# Patient Record
Sex: MALE | Race: OTHER | NOT HISPANIC OR LATINO | URBAN - METROPOLITAN AREA
[De-identification: names, ages, dates, MRNs, and addresses within clinical notes are randomized per-mention and may not be internally consistent; named-entity substitution may affect disease eponyms.]

---

## 2017-10-29 ENCOUNTER — EMERGENCY (EMERGENCY)
Facility: HOSPITAL | Age: 52
LOS: 1 days | Discharge: HOME | End: 2017-10-29
Attending: EMERGENCY MEDICINE
Payer: MEDICAID

## 2017-10-29 PROCEDURE — 99284 EMERGENCY DEPT VISIT MOD MDM: CPT | Mod: 25

## 2017-10-30 VITALS
DIASTOLIC BLOOD PRESSURE: 75 MMHG | OXYGEN SATURATION: 97 % | SYSTOLIC BLOOD PRESSURE: 120 MMHG | RESPIRATION RATE: 16 BRPM | TEMPERATURE: 98 F | WEIGHT: 149.91 LBS | HEIGHT: 66 IN | HEART RATE: 65 BPM

## 2017-10-30 PROCEDURE — 99283 EMERGENCY DEPT VISIT LOW MDM: CPT

## 2017-10-30 RX ORDER — DEXAMETHASONE 0.5 MG/5ML
4 ELIXIR ORAL ONCE
Qty: 0 | Refills: 0 | Status: COMPLETED | OUTPATIENT
Start: 2017-10-30 | End: 2017-10-30

## 2017-10-30 RX ADMIN — Medication 4 MILLIGRAM(S): at 00:54

## 2017-10-30 RX ADMIN — Medication 1 TABLET(S): at 00:54

## 2017-10-30 NOTE — ED PROVIDER NOTE - CHPI ED SYMPTOMS NEG
no chills/no fever/no headache/no shortness of breath/no vomiting/no decreased eating/drinking/no diarrhea/no abdominal pain/no rash/no cough

## 2017-10-30 NOTE — ED PROVIDER NOTE - OBJECTIVE STATEMENT
my throat hurts  51 year old, non smoker works as a  complaining of pain to throat for several weeks much worse over two days.  no fever no chills, able to swallow with out difficutly but with pain,  no sob   no cough no sick contacts no travel

## 2017-10-30 NOTE — ED PROVIDER NOTE - THROAT FINDINGS
THROAT RED/uvula midline/NO VESICLES/ULCERS/NO DROOLING/NO TONGUE ELEVATION/TONSILLAR SWELLING/NO STRIDOR/no exudate/no vesicles

## 2017-11-06 DIAGNOSIS — J03.90 ACUTE TONSILLITIS, UNSPECIFIED: ICD-10-CM

## 2018-06-10 ENCOUNTER — EMERGENCY (EMERGENCY)
Facility: HOSPITAL | Age: 53
LOS: 1 days | Discharge: LEFT BEFORE DISCHARGE | End: 2018-06-10
Attending: EMERGENCY MEDICINE

## 2018-06-10 VITALS
HEIGHT: 66 IN | WEIGHT: 169.98 LBS | OXYGEN SATURATION: 97 % | RESPIRATION RATE: 16 BRPM | DIASTOLIC BLOOD PRESSURE: 88 MMHG | TEMPERATURE: 98 F | SYSTOLIC BLOOD PRESSURE: 139 MMHG | HEART RATE: 16 BPM

## 2018-06-10 NOTE — ED ADULT NURSE NOTE - CAS ED LWBS REASON YN
PATIENT LEFT WITHOUT BEING SEEN BY  AND THIS RN , CALLED IN ED WAITING ROOM X3 , NO ANSWER , PATIENT CALLED VIA TELEPHONE # PROVIDED , UNABLE TO REACH , MESSAGE LEFT TO CALL ED . AWARE./no

## 2018-06-10 NOTE — ED ADULT TRIAGE NOTE - CHIEF COMPLAINT QUOTE
"When I eat the food tries to come up and my breath smells very bad. I got medicine for this here before and it helped me"

## 2018-10-14 ENCOUNTER — EMERGENCY (EMERGENCY)
Facility: HOSPITAL | Age: 53
LOS: 1 days | Discharge: HOME | End: 2018-10-14
Attending: EMERGENCY MEDICINE
Payer: MEDICAID

## 2018-10-14 VITALS
HEART RATE: 78 BPM | SYSTOLIC BLOOD PRESSURE: 164 MMHG | DIASTOLIC BLOOD PRESSURE: 92 MMHG | RESPIRATION RATE: 17 BRPM | OXYGEN SATURATION: 100 % | TEMPERATURE: 99 F

## 2018-10-14 PROCEDURE — 99283 EMERGENCY DEPT VISIT LOW MDM: CPT

## 2018-10-14 RX ORDER — CHLORHEXIDINE GLUCONATE 213 G/1000ML
15 SOLUTION TOPICAL
Qty: 200 | Refills: 0 | OUTPATIENT
Start: 2018-10-14 | End: 2018-11-12

## 2018-10-14 RX ORDER — OMEPRAZOLE 10 MG/1
1 CAPSULE, DELAYED RELEASE ORAL
Qty: 30 | Refills: 0 | OUTPATIENT
Start: 2018-10-14 | End: 2018-11-12

## 2018-10-14 NOTE — ED PROVIDER NOTE - OBJECTIVE STATEMENT
53 y/o M pt presents to ED explaining that every time he drinks coffee or tea it comes out of his nose. Food does not come out of his nose or any other beverages, only coffee and tea. He has been experiencing these symptoms for about a year. Pt says his stomach doesn't hurt when he eats but he is hungry often. Pt admits that he is under a lot of stress.

## 2020-07-10 NOTE — ED PROVIDER NOTE - MEDICAL DECISION MAKING DETAILS
Patient Pt was advised to obtain PMD to obtain medical care. Will follow up with dentist, ENT, and GI.

## 2022-05-21 ENCOUNTER — EMERGENCY (EMERGENCY)
Facility: HOSPITAL | Age: 57
LOS: 1 days | Discharge: HOME | End: 2022-05-21
Attending: EMERGENCY MEDICINE
Payer: SELF-PAY

## 2022-05-21 VITALS
RESPIRATION RATE: 18 BRPM | HEIGHT: 66 IN | SYSTOLIC BLOOD PRESSURE: 148 MMHG | OXYGEN SATURATION: 99 % | TEMPERATURE: 98 F | WEIGHT: 160.06 LBS | HEART RATE: 58 BPM | DIASTOLIC BLOOD PRESSURE: 80 MMHG

## 2022-05-21 PROCEDURE — 99283 EMERGENCY DEPT VISIT LOW MDM: CPT

## 2022-05-21 RX ORDER — ACETAMINOPHEN 500 MG
650 TABLET ORAL ONCE
Refills: 0 | Status: COMPLETED | OUTPATIENT
Start: 2022-05-21 | End: 2022-05-21

## 2022-05-21 RX ORDER — IBUPROFEN 200 MG
600 TABLET ORAL ONCE
Refills: 0 | Status: COMPLETED | OUTPATIENT
Start: 2022-05-21 | End: 2022-05-21

## 2022-05-21 RX ORDER — CYCLOBENZAPRINE HYDROCHLORIDE 10 MG/1
1 TABLET, FILM COATED ORAL
Qty: 6 | Refills: 0
Start: 2022-05-21 | End: 2022-05-22

## 2022-05-21 RX ADMIN — Medication 650 MILLIGRAM(S): at 12:28

## 2022-05-21 RX ADMIN — Medication 650 MILLIGRAM(S): at 12:07

## 2022-05-21 RX ADMIN — Medication 600 MILLIGRAM(S): at 12:28

## 2022-05-21 RX ADMIN — Medication 600 MILLIGRAM(S): at 11:43

## 2022-05-21 NOTE — ED PROVIDER NOTE - NS ED ATTENDING STATEMENT MOD
I have seen and examined this patient and fully participated in the care of this patient as the teaching attending.  The service was shared with the YOLA.  I reviewed and verified the documentation and independently performed the documented:
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
## 2022-05-21 NOTE — ED PROVIDER NOTE - CLINICAL SUMMARY MEDICAL DECISION MAKING FREE TEXT BOX
Concern for muscle spasm. Pt is well appearing, afebrile. Has FROM in neck without spinal ttp. Pain mgmt. Pt drove here today, will send home with muscle relaxant - educated that this medication can make him drowsy. Return precautions are discussed.

## 2022-05-21 NOTE — ED PROVIDER NOTE - NSFOLLOWUPINSTRUCTIONS_ED_ALL_ED_FT
You were seen for neck pain and back pain.     A presumptive diagnosis is made today, but further evaluation may be required by your primary care doctor and/or specialist for a definitive diagnosis. Therefore, follow up as directed and if symptoms change/worsen or any emergency conditions, please return to the ER.    For pain or fever you can ibuprofen (motrin, advil) or tylenol as needed, as directed on packaging.  You can use 400-600mg Ibuprofen (such as motrin or advil) every 6 to 8 hours as needed for pain control.  Take ibuprofen with food or milk to lessen stomach upset.  This is an over-the-counter medication please respect the warnings on the label. All medications come with certain risks and side effects that you need to discuss with your doctor, especially if you are taking them for a prolonged period.    You can use 500-1000mg Tylenol every 6 hours for pain - as needed.  This is an over-the-counter medications - please respect the warnings on the label. This medication come with certain risks and side effects that you need to discuss with your doctor, especially if you are taking it for a prolonged period.    If needed, call patient access services at [phone] to find a primary care doctor, or call at [phone] to make an appointment at the clinic.      Motor Vehicle Collision Injury, Adult      After a car accident (motor vehicle collision), it is common to have injuries to your head, face, arms, and body. These injuries may include:  •Cuts.      •Burns.      •Bruises.      •Sore muscles or a stretch or tear in a muscle (strain).      •Headaches.      You may feel stiff and sore for the first several hours. You may feel worse after waking up the first morning after the accident. These injuries often feel worse for the first 24–48 hours. After that, you will usually begin to get better with each day. How quickly you get better often depends on:  •How bad the accident was.      •How many injuries you have.      •Where your injuries are.      •What types of injuries you have.      •If you were wearing a seat belt.      •If your airbag was used.      A head injury may result in a concussion. This is a type of brain injury that can have serious effects. If you have a concussion, you should rest as told by your doctor. You must be very careful to avoid having a second concussion.      Follow these instructions at home:    Medicines     •Take over-the-counter and prescription medicines only as told by your doctor.      •If you were prescribed antibiotic medicine, take or apply it as told by your doctor. Do not stop using the antibiotic even if your condition gets better.        If you have a wound or a burn:    •Clean your wound or burn as told by your doctor.  •Wash it with mild soap and water.      •Rinse it with water to get all the soap off.      •Pat it dry with a clean towel. Do not rub it.      •If you were told to put an ointment or cream on the wound, do so as told by your doctor.      •Follow instructions from your doctor about how to take care of your wound or burn. Make sure you:  •Know when and how to change or remove your bandage (dressing).      •Always wash your hands with soap and water before and after you change your bandage. If you cannot use soap and water, use hand .      •Leave stitches (sutures), skin glue, or skin tape (adhesive) strips in place, if you have these. They may need to stay in place for 2 weeks or longer. If tape strips get loose and curl up, you may trim the loose edges. Do not remove tape strips completely unless your doctor says it is okay.      • Do not:   •Scratch or pick at the wound or burn.      •Break any blisters you may have.      •Peel any skin.        •Avoid getting sun on your wound or burn.      •Raise (elevate) the wound or burn above the level of your heart while you are sitting or lying down. If you have a wound or burn on your face, you may want to sleep with your head raised. You may do this by putting an extra pillow under your head.    •Check your wound or burn every day for signs of infection. Check for:  •More redness, swelling, or pain.      •More fluid or blood.      •Warmth.      •Pus or a bad smell.        Activity   •Rest. Rest helps your body to heal. Make sure you:  •Get plenty of sleep at night. Avoid staying up late.      •Go to bed at the same time on weekends and weekdays.        •Ask your doctor if you have any limits to what you can lift.      •Ask your doctor when you can drive, ride a bicycle, or use heavy machinery. Do not do these activities if you are dizzy.      •If you are told to wear a brace on an injured arm, leg, or other part of your body, follow instructions from your doctor about activities. Your doctor may give you instructions about driving, bathing, exercising, or working.        General instructions                 •If told, put ice on the injured areas.  •Put ice in a plastic bag.      •Place a towel between your skin and the bag.      •Leave the ice on for 20 minutes, 2–3 times a day.        •Drink enough fluid to keep your pee (urine) pale yellow.      • Do not drink alcohol.      •Eat healthy foods.      •Keep all follow-up visits as told by your doctor. This is important.        Contact a doctor if:    •Your symptoms get worse.      •You have neck pain that gets worse or has not improved after 1 week.      •You have signs of infection in a wound or burn.      •You have a fever.    •You have any of the following symptoms for more than 2 weeks after your car accident:  •Lasting (chronic) headaches.      •Dizziness or balance problems.      •Feeling sick to your stomach (nauseous).      •Problems with how you see (vision).      •More sensitivity to noise or light.      •Depression or mood swings.      •Feeling worried or nervous (anxiety).      •Getting upset or bothered easily.      •Memory problems.      •Trouble concentrating or paying attention.      •Sleep problems.      •Feeling tired all the time.    Get help right away if:  •You have:  •Loss of feeling (numbness), tingling, or weakness in your arms or legs.      •Very bad neck pain, especially tenderness in the middle of the back of your neck.    •A change in your ability to control your pee or poop (stool).    •More pain in any area of your body.    •Swelling in any area of your body, especially your legs.    •Shortness of breath or light-headedness.    •Chest pain.    •Blood in your pee, poop, or vomit.    •Very bad pain in your belly (abdomen) or your back.    •Very bad headaches or headaches that are getting worse.    •Sudden vision loss or double vision.    •Your eye suddenly turns red.    •The black center of your eye (pupil) is an odd shape or size.      Summary    •After a car accident (motor vehicle collision), it is common to have injuries to your head, face, arms, and body.    •Follow instructions from your doctor about how to take care of a wound or burn.    •If told, put ice on your injured areas.    •Contact a doctor if your symptoms get worse.    •Keep all follow-up visits as told by your doctor. You were seen for neck pain and back pain.     A presumptive diagnosis is made today, but further evaluation may be required by your primary care doctor and/or specialist for a definitive diagnosis. Therefore, follow up as directed and if symptoms change/worsen or any emergency conditions, please return to the ER.    For pain or fever you can ibuprofen (motrin, advil) or tylenol as needed, as directed on packaging.  You can use 400-600mg Ibuprofen (such as motrin or advil) every 6 to 8 hours as needed for pain control.  Take ibuprofen with food or milk to lessen stomach upset.  This is an over-the-counter medication please respect the warnings on the label. All medications come with certain risks and side effects that you need to discuss with your doctor, especially if you are taking them for a prolonged period.    You can use 500-1000mg Tylenol every 6 hours for pain - as needed.  This is an over-the-counter medications - please respect the warnings on the label. This medication come with certain risks and side effects that you need to discuss with your doctor, especially if you are taking it for a prolonged period.    Muscle relaxant - Flereril is sent to your pharmacy - this medication can make you drowsy. Please do not drive or use heavy machinery after taking this medication.    If needed, call patient access services at [phone] to find a primary care doctor, or call at [phone] to make an appointment at the clinic.      Motor Vehicle Collision Injury, Adult      After a car accident (motor vehicle collision), it is common to have injuries to your head, face, arms, and body. These injuries may include:  •Cuts.      •Burns.      •Bruises.      •Sore muscles or a stretch or tear in a muscle (strain).      •Headaches.      You may feel stiff and sore for the first several hours. You may feel worse after waking up the first morning after the accident. These injuries often feel worse for the first 24–48 hours. After that, you will usually begin to get better with each day. How quickly you get better often depends on:  •How bad the accident was.      •How many injuries you have.      •Where your injuries are.      •What types of injuries you have.      •If you were wearing a seat belt.      •If your airbag was used.      A head injury may result in a concussion. This is a type of brain injury that can have serious effects. If you have a concussion, you should rest as told by your doctor. You must be very careful to avoid having a second concussion.      Follow these instructions at home:    Medicines     •Take over-the-counter and prescription medicines only as told by your doctor.      •If you were prescribed antibiotic medicine, take or apply it as told by your doctor. Do not stop using the antibiotic even if your condition gets better.        If you have a wound or a burn:    •Clean your wound or burn as told by your doctor.  •Wash it with mild soap and water.      •Rinse it with water to get all the soap off.      •Pat it dry with a clean towel. Do not rub it.      •If you were told to put an ointment or cream on the wound, do so as told by your doctor.      •Follow instructions from your doctor about how to take care of your wound or burn. Make sure you:  •Know when and how to change or remove your bandage (dressing).      •Always wash your hands with soap and water before and after you change your bandage. If you cannot use soap and water, use hand .      •Leave stitches (sutures), skin glue, or skin tape (adhesive) strips in place, if you have these. They may need to stay in place for 2 weeks or longer. If tape strips get loose and curl up, you may trim the loose edges. Do not remove tape strips completely unless your doctor says it is okay.      • Do not:   •Scratch or pick at the wound or burn.      •Break any blisters you may have.      •Peel any skin.        •Avoid getting sun on your wound or burn.      •Raise (elevate) the wound or burn above the level of your heart while you are sitting or lying down. If you have a wound or burn on your face, you may want to sleep with your head raised. You may do this by putting an extra pillow under your head.    •Check your wound or burn every day for signs of infection. Check for:  •More redness, swelling, or pain.      •More fluid or blood.      •Warmth.      •Pus or a bad smell.        Activity   •Rest. Rest helps your body to heal. Make sure you:  •Get plenty of sleep at night. Avoid staying up late.      •Go to bed at the same time on weekends and weekdays.        •Ask your doctor if you have any limits to what you can lift.      •Ask your doctor when you can drive, ride a bicycle, or use heavy machinery. Do not do these activities if you are dizzy.      •If you are told to wear a brace on an injured arm, leg, or other part of your body, follow instructions from your doctor about activities. Your doctor may give you instructions about driving, bathing, exercising, or working.        General instructions                 •If told, put ice on the injured areas.  •Put ice in a plastic bag.      •Place a towel between your skin and the bag.      •Leave the ice on for 20 minutes, 2–3 times a day.        •Drink enough fluid to keep your pee (urine) pale yellow.      • Do not drink alcohol.      •Eat healthy foods.      •Keep all follow-up visits as told by your doctor. This is important.        Contact a doctor if:    •Your symptoms get worse.      •You have neck pain that gets worse or has not improved after 1 week.      •You have signs of infection in a wound or burn.      •You have a fever.    •You have any of the following symptoms for more than 2 weeks after your car accident:  •Lasting (chronic) headaches.      •Dizziness or balance problems.      •Feeling sick to your stomach (nauseous).      •Problems with how you see (vision).      •More sensitivity to noise or light.      •Depression or mood swings.      •Feeling worried or nervous (anxiety).      •Getting upset or bothered easily.      •Memory problems.      •Trouble concentrating or paying attention.      •Sleep problems.      •Feeling tired all the time.    Get help right away if:  •You have:  •Loss of feeling (numbness), tingling, or weakness in your arms or legs.      •Very bad neck pain, especially tenderness in the middle of the back of your neck.    •A change in your ability to control your pee or poop (stool).    •More pain in any area of your body.    •Swelling in any area of your body, especially your legs.    •Shortness of breath or light-headedness.    •Chest pain.    •Blood in your pee, poop, or vomit.    •Very bad pain in your belly (abdomen) or your back.    •Very bad headaches or headaches that are getting worse.    •Sudden vision loss or double vision.    •Your eye suddenly turns red.    •The black center of your eye (pupil) is an odd shape or size.      Summary    •After a car accident (motor vehicle collision), it is common to have injuries to your head, face, arms, and body.    •Follow instructions from your doctor about how to take care of a wound or burn.    •If told, put ice on your injured areas.    •Contact a doctor if your symptoms get worse.    •Keep all follow-up visits as told by your doctor.

## 2022-05-21 NOTE — ED PROVIDER NOTE - PHYSICAL EXAMINATION
Gen: well appearing  Head: NC/NT  ENT: airway patent, mmm  Resp: no respiratory distress   GI:  abdomen soft non-distended, NTTP   Back: no spinal ttp, +RT cervical and thoracic paraspinal ttp, able to range his neck with slight discomfort when looking to LT   Extremities - no ttp  Neuro: A&Ox4, smooth gait, 5/5 strength and sensation intact

## 2022-05-21 NOTE — ED PROVIDER NOTE - PATIENT PORTAL LINK FT
You can access the FollowMyHealth Patient Portal offered by [facility] by registering at the following website: [email or web address]. By joining [facility]’s FollowMyHealth portal, you will also be able to view your health information using other applications (apps) compatible with our system.
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
## 2023-11-16 NOTE — ED ADULT NURSE NOTE - NSFALLRSKASSESSDT_ED_ALL_ED
[facility] - Zarina Lui, Yariel Guo, Mandeep, Sebastian Dumont  Office Number: [phone]    Initial Consult Note    CHIEF COMPLAINT: Patient is a 56y old  Male who presents with a chief complaint of     HPI:   56 year old male with pmh hypertension, myopericarditis, varicose veins, hyperlipidemia,  pancreatitis,  cardiac catheterization in 2009 normal coronary arteries and mild to moderate global left ventricular dysfunction with an ejection fraction of 40%,  small acute left corona radiata infarct , recently stopped norvasc 2/2 gum inflammation he is followed by Dr Kaufman. now presenting to ed found with elevated blood pressure.   Patient had headache at home found with bp 210/110 now presenting to ed for further evaluation.   Denies chest pain dizziness palpitations shortness of breath.     .      PAST MEDICAL & SURGICAL HISTORY:  Essential hypertension      History of stroke  2011, no neuro deficits      History of pancreatitis  2012 due to taking HCTZ      JUAN PABLO on CPAP      Seasonal allergies      Hyperlipidemia      H/O squamous cell carcinoma excision  (Right ring finger)      Unilateral inguinal hernia, without obstruction or gangrene, not specified as recurrent      S/P trigger finger release  (Right, 9/2010)      History of left hip replacement  (2017)      S/P colonoscopy          SOCIAL HISTORY:  No tobacco, ethanol, or drug abuse.    FAMILY HISTORY:  Family history of lung cancer (Father)      No family history of acute MI or sudden cardiac death.    MEDICATIONS  (STANDING):    MEDICATIONS  (PRN):      Allergies    hydrochlorothiazide (Other)    Intolerances        REVIEW OF SYSTEMS:  All other review of systems is negative unless indicated above    VITAL SIGNS:   Vital Signs Last 24 Hrs  T(C): 36.7 (16 Nov 2023 13:24), Max: 36.7 (16 Nov 2023 13:24)  T(F): 98.1 (16 Nov 2023 13:24), Max: 98.1 (16 Nov 2023 13:24)  HR: 52 (16 Nov 2023 13:29) (52 - 52)  BP: 175/87 (16 Nov 2023 13:29) (168/111 - 175/87)  BP(mean): --  RR: 18 (16 Nov 2023 13:24) (18 - 18)  SpO2: 98% (16 Nov 2023 13:24) (98% - 98%)        I&O's Summary      On Exam:    Constitutional: NAD, alert and oriented x 3  Lungs:  Non-labored, breath sounds are clear bilaterally, No wheezing, rales or rhonchi  Cardiovascular: RRR.  S1 and S2 positive.  No murmurs, rubs, gallops or clicks  Gastrointestinal: Bowel Sounds present, soft, nontender.   Lymph: No peripheral edema. No cervical lymphadenopathy.  Neurological: Alert, no focal deficits  Skin: No rashes or ulcers   Psych:  Mood & affect appropriate.    LABS: All Labs Reviewed:                Blood Culture:         RADIOLOGY:    EKG:           [facility] - Zarina Lui, Yariel Guo, Mandeep, Sebastian Dumont  Office Number: [phone]    Initial Consult Note    CHIEF COMPLAINT: Patient is a 56y old  Male who presents with a chief complaint of     HPI:   56 year old male with pmh hypertension, myopericarditis, varicose veins, hyperlipidemia,  pancreatitis,  cardiac catheterization in 2009 normal coronary arteries and mild to moderate global left ventricular dysfunction with an ejection fraction of 40%,  small acute left corona radiata infarct , recently stopped norvasc 2/2 gum inflammation he is followed by Dr Kaufman. now presenting to ed found with elevated blood pressure.   Patient had headache at home found with bp 210/110 now presenting to ed for further evaluation.   Denies chest pain dizziness palpitations shortness of breath.     .      PAST MEDICAL & SURGICAL HISTORY:  Essential hypertension      History of stroke  2011, no neuro deficits      History of pancreatitis  2012 due to taking HCTZ      JUAN PABLO on CPAP      Seasonal allergies      Hyperlipidemia      H/O squamous cell carcinoma excision  (Right ring finger)      Unilateral inguinal hernia, without obstruction or gangrene, not specified as recurrent      S/P trigger finger release  (Right, 9/2010)      History of left hip replacement  (2017)      S/P colonoscopy          SOCIAL HISTORY:  No tobacco, ethanol, or drug abuse.    FAMILY HISTORY:  Family history of lung cancer (Father)      No family history of acute MI or sudden cardiac death.    MEDICATIONS  (STANDING):    MEDICATIONS  (PRN):      Allergies    hydrochlorothiazide (Other)    Intolerances        REVIEW OF SYSTEMS:  All other review of systems is negative unless indicated above    VITAL SIGNS:   Vital Signs Last 24 Hrs  T(C): 36.7 (16 Nov 2023 13:24), Max: 36.7 (16 Nov 2023 13:24)  T(F): 98.1 (16 Nov 2023 13:24), Max: 98.1 (16 Nov 2023 13:24)  HR: 52 (16 Nov 2023 13:29) (52 - 52)  BP: 175/87 (16 Nov 2023 13:29) (168/111 - 175/87)  BP(mean): --  RR: 18 (16 Nov 2023 13:24) (18 - 18)  SpO2: 98% (16 Nov 2023 13:24) (98% - 98%)        I&O's Summary      On Exam:    Constitutional: NAD, alert and oriented x 3  Lungs:  Non-labored, breath sounds are clear bilaterally, No wheezing, rales or rhonchi  Cardiovascular: RRR.  S1 and S2 positive.  No murmurs, rubs, gallops or clicks  Gastrointestinal: Bowel Sounds present, soft, nontender.   Lymph: No peripheral edema. No cervical lymphadenopathy.  Neurological: Alert, no focal deficits  Skin: No rashes or ulcers   Psych:  Mood & affect appropriate.    LABS: All Labs Reviewed:                Blood Culture:         RADIOLOGY:    EKG:< from: 12 Lead ECG (11.16.23 @ 13:37) >  Ventricular Rate 51 BPM    Atrial Rate 51 BPM    P-R Interval 206 ms    QRS Duration 88 ms    Q-T Interval 456 ms    QTC Calculation(Bazett) 420 ms    P Axis 36 degrees    R Axis 6 degrees    T Axis 21 degrees    Diagnosis Line Sinus bradycardia  artifact limits interpretation    < end of copied text >             [facility] - Zarina Lui, Yariel Guo, Mandeep, Sebastian Dumont  Office Number: [phone]    Initial Consult Note    CHIEF COMPLAINT: Patient is a 56y old  Male who presents with a chief complaint of     HPI:   56 year old male with pmh hypertension, myopericarditis, varicose veins, hyperlipidemia,  pancreatitis,  cardiac catheterization in 2009 normal coronary arteries and mild to moderate global left ventricular dysfunction with an ejection fraction of 40%, now resolved,  small acute left corona radiata infarct, recently stopped norvasc 2/2 gum inflammation he is followed by Dr Kaufman. now presenting to ed found with elevated blood pressure.   Patient had headache at home found with bp 210/110 now presenting to ed for further evaluation.   Denies chest pain dizziness palpitations shortness of breath.         PAST MEDICAL & SURGICAL HISTORY:  Essential hypertension      History of stroke  2011, no neuro deficits      History of pancreatitis  2012 due to taking HCTZ      JUAN PABLO on CPAP      Seasonal allergies      Hyperlipidemia      H/O squamous cell carcinoma excision  (Right ring finger)      Unilateral inguinal hernia, without obstruction or gangrene, not specified as recurrent      S/P trigger finger release  (Right, 9/2010)      History of left hip replacement  (2017)      S/P colonoscopy          SOCIAL HISTORY:  No tobacco, ethanol, or drug abuse.    FAMILY HISTORY:  Family history of lung cancer (Father)      No family history of acute MI or sudden cardiac death.    MEDICATIONS  (STANDING):    MEDICATIONS  (PRN):      Allergies    hydrochlorothiazide (Other)    Intolerances        REVIEW OF SYSTEMS:  All other review of systems is negative unless indicated above    VITAL SIGNS:   Vital Signs Last 24 Hrs  T(C): 36.7 (16 Nov 2023 13:24), Max: 36.7 (16 Nov 2023 13:24)  T(F): 98.1 (16 Nov 2023 13:24), Max: 98.1 (16 Nov 2023 13:24)  HR: 52 (16 Nov 2023 13:29) (52 - 52)  BP: 175/87 (16 Nov 2023 13:29) (168/111 - 175/87)  BP(mean): --  RR: 18 (16 Nov 2023 13:24) (18 - 18)  SpO2: 98% (16 Nov 2023 13:24) (98% - 98%)        I&O's Summary      On Exam:    Constitutional: NAD, alert and oriented x 3  Lungs:  Non-labored, breath sounds are clear bilaterally, No wheezing, rales or rhonchi  Cardiovascular: RRR.  S1 and S2 positive.  No murmurs, rubs, gallops or clicks  Gastrointestinal: Bowel Sounds present, soft, nontender.   Lymph: No peripheral edema. No cervical lymphadenopathy.  Neurological: Alert, no focal deficits  Skin: No rashes or ulcers   Psych:  Mood & affect appropriate.    LABS: All Labs Reviewed:                Blood Culture:         RADIOLOGY:    EKG:< from: 12 Lead ECG (11.16.23 @ 13:37) >  Ventricular Rate 51 BPM    Atrial Rate 51 BPM    P-R Interval 206 ms    QRS Duration 88 ms    Q-T Interval 456 ms    QTC Calculation(Bazett) 420 ms    P Axis 36 degrees    R Axis 6 degrees    T Axis 21 degrees    Diagnosis Line Sinus bradycardia  artifact limits interpretation    < end of copied text >             21-May-2022 11:59

## 2024-02-15 NOTE — ED PROVIDER NOTE - NEUROLOGICAL, MLM
Patient has no objection to blood transfusions.
Alert and oriented, no focal deficits, no motor or sensory deficits.

## 2024-03-05 NOTE — ED PROVIDER NOTE - CARE PLAN
Render Risk Assessment In Note?: no Detail Level: Detailed Additional Notes: Father passed from ocular melanoma Principal Discharge DX:	Regurgitation of food  Secondary Diagnosis:	Gum disease